# Patient Record
Sex: FEMALE | Race: WHITE
[De-identification: names, ages, dates, MRNs, and addresses within clinical notes are randomized per-mention and may not be internally consistent; named-entity substitution may affect disease eponyms.]

---

## 2018-11-16 ENCOUNTER — HOSPITAL ENCOUNTER (EMERGENCY)
Dept: HOSPITAL 33 - ED | Age: 53
Discharge: HOME | End: 2018-11-16
Payer: COMMERCIAL

## 2018-11-16 VITALS — HEART RATE: 77 BPM | DIASTOLIC BLOOD PRESSURE: 87 MMHG | SYSTOLIC BLOOD PRESSURE: 136 MMHG | OXYGEN SATURATION: 98 %

## 2018-11-16 DIAGNOSIS — K08.89: Primary | ICD-10-CM

## 2018-11-16 PROCEDURE — 99283 EMERGENCY DEPT VISIT LOW MDM: CPT

## 2018-11-16 NOTE — ERPHSYRPT
- History of Present Illness


Time Seen by Provider: 11/16/18 08:46


Source: patient


Physician History: 





mild to mod lower left toothache today, no fever, speech fluent





- Review of Systems


Constitutional: No Fever


Eyes: No Eye Redness


Ears, Nose, & Throat: No Throat Swelling


Respiratory: No Dyspnea


Cardiac: No Chest Pain


Abdominal/Gastrointestinal: No Vomiting


Skin: No Rash


Neurological: No Dizziness





- Physical Exam


General Appearance: no apparent distress


Eye Exam: eyes nml inspection


Ears, Nose, Throat Exam: moist mucous membranes, other (tender sts erythema of 

left dental margin, no fluc mass, no trismus)


Neck Exam: normal inspection


Respiratory Exam: No respiratory distress


Neurologic Exam: alert, oriented x 3, cooperative


Skin Exam: warm, dry





- Course


Nursing assessment & vital signs reviewed: Yes


Ordered Tests: 





Medication Summary











Generic Name Dose Route Start Last Admin





  Trade Name Freq  PRN Reason Stop Dose Admin


 


Clindamycin HCl  150 mg  11/16/18 08:44  





  Cleocin 150 Mg Capsule***  PO  11/16/18 08:45  





  STAT ONE   





     





     





     





     


 


Lidocaine HCl  20 ml  11/16/18 08:45  





  Xylocaine Hcl Viscous *  MM  11/16/18 08:46  





  PRN ONE   





     





     





     





     














- Progress


Progress: improved


Progress Note: 





11/16/18 08:51


see your dentist, return if worse, cleocin, viscus lidocaine


Counseled pt/family regarding: diagnosis, need for follow-up





- Departure


Time of Disposition: 08:51


Departure Disposition: Home


Clinical Impression: 


 Pain, dental





Condition: Stable


Critical Care Time: No


Instructions:  Tooth Decay, Adult (DC)


Additional Instructions: 


see your dentist, return if worse


Prescriptions: 


Clindamycin HCl [Cleocin HCl] 150 mg PO TID #30 capsule

## 2019-10-13 ENCOUNTER — HOSPITAL ENCOUNTER (EMERGENCY)
Dept: HOSPITAL 33 - ED | Age: 54
Discharge: HOME | End: 2019-10-13
Payer: COMMERCIAL

## 2019-10-13 VITALS — OXYGEN SATURATION: 95 %

## 2019-10-13 VITALS — DIASTOLIC BLOOD PRESSURE: 63 MMHG | SYSTOLIC BLOOD PRESSURE: 115 MMHG

## 2019-10-13 VITALS — HEART RATE: 58 BPM

## 2019-10-13 DIAGNOSIS — N12: Primary | ICD-10-CM

## 2019-10-13 LAB
BASOPHILS # BLD AUTO: 0.04 10*3/UL (ref 0–0.4)
BASOPHILS NFR BLD AUTO: 0.5 % (ref 0–0.4)
EOSINOPHIL # BLD AUTO: 0.25 10*3/UL (ref 0–0.5)
GLUCOSE UR-MCNC: NEGATIVE MG/DL
HCT VFR BLD AUTO: 43.8 % (ref 35–47)
HGB BLD-MCNC: 15 GM/DL (ref 12–16)
LYMPHOCYTES # SPEC AUTO: 2.63 10*3/UL (ref 1–4.6)
MCH RBC QN AUTO: 31.2 PG (ref 26–32)
MCHC RBC AUTO-ENTMCNC: 34.2 G/DL (ref 32–36)
MONOCYTES # BLD AUTO: 0.64 10*3/UL (ref 0–1.3)
PLATELET # BLD AUTO: 250 K/MM3 (ref 150–450)
PROT UR STRIP-MCNC: 100 MG/DL
RBC # BLD AUTO: 4.81 M/MM3 (ref 4.1–5.4)
RBC #/AREA URNS HPF: >101 /HPF (ref 0–2)
WBC # BLD AUTO: 8.2 K/MM3 (ref 4–10.5)
WBC #/AREA URNS HPF: >100 /HPF (ref 0–5)

## 2019-10-13 PROCEDURE — 87077 CULTURE AEROBIC IDENTIFY: CPT

## 2019-10-13 PROCEDURE — 87086 URINE CULTURE/COLONY COUNT: CPT

## 2019-10-13 PROCEDURE — 99283 EMERGENCY DEPT VISIT LOW MDM: CPT

## 2019-10-13 PROCEDURE — 36415 COLL VENOUS BLD VENIPUNCTURE: CPT

## 2019-10-13 PROCEDURE — 87186 SC STD MICRODIL/AGAR DIL: CPT

## 2019-10-13 PROCEDURE — 85025 COMPLETE CBC W/AUTO DIFF WBC: CPT

## 2019-10-13 PROCEDURE — 81001 URINALYSIS AUTO W/SCOPE: CPT

## 2019-10-13 NOTE — ERPHSYRPT
- History of Present Illness


Time Seen by Provider: 10/13/19 15:00


Source: patient


Exam Limitations: no limitations


Patient Subjective Stated Complaint: pt here for pressure to lower abd for a 

couple days, denies fever or n/v


Triage Nursing Assessment: pt alert, resp easy, skin w/d/p. abd soft, urine 

cloudy


Physician History: 


c/o pressure to lower abdomen for a couple days,


Timing/Duration: day(s) (2-3 days)


Activites at Onset: none


Quality: fullness


Onset Location: suprapubic


Pain Radiation: none


Severity of Pain-Max: mild


Severity of Pain-Current: moderate


Prior abdominal problems: none


Sexual intercourse history: non-contributory


Modifying Factors: Improves With: nothing


Associated Symptoms: denies symptoms


Allergies/Adverse Reactions: 








Penicillins Allergy (Verified 10/13/19 15:05)


 





Home Medications: 








Escitalopram Oxalate 10 mg** [Lexapro 10 MG**] 10 mg DAILY 10/13/19 [History]


Levothyroxine Sodium 25 mcg DAILY 10/13/19 [History]


Pravastatin Sodium 20 mg DAILY 10/13/19 [History]





Hx Influenza Vaccination/Date Given: No


Hx Pneumococcal Vaccination/Date Given: No





- Review of Systems


Constitutional: No Fever, No Chills


Eyes: No Symptoms


Ears, Nose, & Throat: No Symptoms


Respiratory: No Cough, No Dyspnea


Cardiac: No Chest Pain, No Edema, No Syncope


Abdominal/Gastrointestinal: No Abdominal Pain, No Nausea, No Vomiting, No 

Diarrhea


Genitourinary Symptoms: Hesitancy, No Dysuria


Musculoskeletal: No Back Pain, No Neck Pain


Skin: No Rash


Neurological: No Dizziness, No Focal Weakness, No Sensory Changes


Psychological: No Symptoms


Endocrine: No Symptoms


All Other Systems: Reviewed and Negative





- Past Medical History


Pertinent Past Medical History: Yes


Cardiac History: High Cholesterol, Hypertension


Psycho-Social History: Depression





- Past Surgical History


Past Surgical History: Yes


Genitourinary: Kidney Surgery


Musculoskeletal: Orthopedic Surgery


Female Surgical History: Hysterectomy


Other Surgical History: kidney removed right ,ankle,ear surg





- Social History


Smoking Status: Never smoker


Exposure to second hand smoke: No


Drug Use: none


Patient Lives Alone: No





- Female History


Hx Last Menstrual Period: post


Hx Pregnant Now: No





- Nursing Vital Signs


Nursing Vital Signs: 


 Initial Vital Signs











Temperature  98 F   10/13/19 14:48


 


Pulse Rate  58 L  10/13/19 14:48


 


Respiratory Rate  16   10/13/19 14:48


 


Blood Pressure  125/91   10/13/19 14:48


 


O2 Sat by Pulse Oximetry  95   10/13/19 14:48








 Pain Scale











Pain Intensity                 0

















- Physical Exam


General Appearance: no apparent distress, alert


Eye Exam: PERRL/EOMI, eyes nml inspection


Ears, Nose, Throat Exam: normal ENT inspection, TMs normal, pharynx normal, 

moist mucous membranes


Neck Exam: normal inspection, non-tender, supple, full range of motion


Respiratory Exam: normal breath sounds, lungs clear, No respiratory distress


Cardiovascular Exam: regular rate/rhythm, normal heart sounds, normal 

peripheral pulses


Gastrointestinal/Abdomen Exam: soft, No tenderness, No mass


Back Exam: normal inspection, normal range of motion, No CVA tenderness, No 

vertebral tenderness


Extremity Exam: normal inspection, normal range of motion, pelvis stable


Neurologic Exam: alert, oriented x 3, cooperative, CNs II-XII nml as tested, 

normal mood/affect, sensation nml, No motor deficits


Skin Exam: normal color, warm, dry


Lymphatic Exam: No adenopathy


SpO2: 95





- Course


Nursing assessment & vital signs reviewed: Yes


Ordered Tests: 


 Active Orders 24 hr











 Category Date Time Status


 


 CBC W DIFF Stat Lab  10/13/19 15:47 Completed


 


 CMP Stat Lab  10/13/19 15:47 Received


 


 CULTURE,URINE Stat Lab  10/13/19 16:08 Received


 


 UA W/RFX UR CULTURE Stat Lab  10/13/19 16:08 Completed








Medication Summary














Discontinued Medications














Generic Name Dose Route Start Last Admin





  Trade Name Tiki  PRN Reason Stop Dose Admin


 


Levofloxacin  750 mg  10/13/19 16:27  10/13/19 16:30





  Levofloxacin 250mg Tablet***  PO  10/13/19 16:28  750 mg





  STAT ONE   Administration





     





     





     





     


 


Levofloxacin  Confirm  10/13/19 16:27  





  Levofloxacin 250mg Tablet***  Administered  10/13/19 16:28  





  Dose   





  750 mg   





  .ROUTE   





  .STK-MED ONE   





     





     





     





     











Lab/Rad Data: 


 Laboratory Result Diagrams





 10/13/19 15:47 





 Laboratory Results











  10/13/19 10/13/19 Range/Units





  16:08 15:47 


 


WBC   8.2  (4.0-10.5)  K/mm3


 


RBC   4.81  (4.1-5.4)  M/mm3


 


Hgb   15.0  (12.0-16.0)  gm/dl


 


Hct   43.8  (35-47)  %


 


MCV   91.1  ()  fl


 


MCH   31.2  (26-32)  pg


 


MCHC   34.2  (32-36)  g/dl


 


RDW   13.1  (11.5-14.0)  %


 


Plt Count   250  (150-450)  K/mm3


 


MPV   10.7 H  (6-9.5)  fl


 


Gran %   56.2  (36.0-66.0)  %


 


Eos # (Auto)   0.25  (0-0.5)  


 


Absolute Lymphs (auto)   2.63  (1.0-4.6)  


 


Absolute Monos (auto)   0.64  (0.0-1.3)  


 


Lymphocytes %   32.3  (24.0-44.0)  %


 


Monocytes %   7.9  (0.0-12.0)  %


 


Eosinophils %   3.1  (0.00-5.0)  %


 


Basophils %   0.5  (0.0-0.4)  %


 


Absolute Granulocytes   4.59  (1.4-6.9)  


 


Basophils #   0.04  (0-0.4)  


 


Urine Color  YELLOW   (YELLOW)  


 


Urine Appearance  CLOUDY   (CLEAR)  


 


Urine pH  7.0   (5-6)  


 


Ur Specific Gravity  1.012   (1.005-1.025)  


 


Urine Protein  100   (Negative)  


 


Urine Ketones  NEGATIVE   (NEGATIVE)  


 


Urine Blood  LARGE   (0-5)  Johnathan/ul


 


Urine Nitrite  NEGATIVE   (NEGATIVE)  


 


Urine Bilirubin  NEGATIVE   (NEGATIVE)  


 


Urine Urobilinogen  NEGATIVE   (0-1)  mg/dL


 


Ur Leukocyte Esterase  LARGE   (NEGATIVE)  


 


Urine WBC (Auto)  >100   (0-5)  /HPF


 


Urine RBC (Auto)  >101   (0-2)  /HPF


 


U Hyaline Cast (Auto)  3-5   (0-2)  /LPF


 


U Epithel Cells (Auto)  RARE   (FEW)  /HPF


 


Urine Bacteria (Auto)  MODERATE   (NEGATIVE)  /HPF


 


U Non-Squamous Epi Cells  RARE   (FEW)  /HPF


 


Urine Mucus (Auto)  SLIGHT   (NEGATIVE)  /HPF


 


Urine Yeast (Budding)  Occasional   (NEGATIVE)  /HPF


 


Urine Culture Reflexed  YES   (NO)  


 


Urine Glucose  NEGATIVE   (NEGATIVE)  mg/dL














- Progress


Progress: improved


Air Movement: good


Blood Culture(s) Obtained: Yes


Antibiotics given: Yes


Counseled pt/family regarding: lab results, diagnosis, need for follow-up





- Departure


Departure Disposition: Home


Clinical Impression: 


 Pyelonephritis





Condition: Stable


Critical Care Time: No


Referrals: 


Provider,Unknown [Primary Care Provider] - 


Instructions:  Urinary Tract Infection, Adult (DC), Kidney Infection


Additional Instructions: 


URINARY TRACT INFECTION





1.  You will need to drink plenty of fluids in order to keep your urinary 

system flushed.  These fluids should mainly consist of 


     water and juices.


2.  Take medications as directed.  You need to completely finish any 

antiobiotic prescription given.


3.  Try to avoid coffee, tea, alcohol, and seasoned foods as they may cause 

bladder irritation.


4.  If signs and symptoms persist after 3-4 days, you will need to follow up 

with your family physician.


5.  Female Patients:


   A.  Avoid intercourse for 3-4 days.


   B.  Empty bladder before and after intercourse to reduce risk of re-

infection.


   C.  After emptying bladder, wipe from front to back to reduce the risk of re-

infection.





Discharge/Care Plan





OVIDIO JANG was seen on 10/13/19 in the Emergency Room. The patient was 

counseled regarding Diagnosis,Lab results, Imaging studies, need for follow up 

and when to return to the Emergency Room.





Prescriptions given:





Discharge Note





I have spoken with the patient and/or caregivers. I have explained the patient'

s condition, diagnosis and treatment plan based on the information available to 

me at this time. I have answered the patient's and/or caregiver's questions and 

addressed any concerns. The patient and/or caregivers have as good 

understanding of the patient's diagnosis, condition and treatment plan as can 

be expected at this point. The vital signs have been stable. The patient's 

condition is stable and appropriate for discharge from the emergency department.





The patient will pursue further outpatient evaluation with the primary care 

physician or other designated or consulting physician as outlined in the 

discharge instructions. The patient and/or caregivers are agreeable to this 

plan of care and follow-up instructions have been explained in detail. The 

patient and/or caregivers have received these instruction. The patient/and or 

caregivers are aware that any significant change in condition or worsening of 

symptoms should prompt an immediate return to this or the closest emergency 

department or call 911. 





OVIDIO JANG was seen on 10/13/19 n the Emergency Room. At that time you were 

treated for an emergent condition, during your visit Laboratory, Radiology and/

or other procedures may have been ordered. It is very important that you follow-

up with your Primary Care Physician Unknown Provider within the next 24-48 

hours to review your Emergency Room visit and the final results of testing that 

was ordered.  Some test results such as Urine Cultures, Blood Cultures, and 

other cultures if ordered will not be finalized for 24-48 hours.





If you do not have a Primary Care Provider please call the medical records 

department at [phone] to obtain a copy of your results or you may 

sign into our patient portal to obtain these results by visiting us @ [email or web address]

[email or web address] and completing the following steps:





1. Click on the Patient Portal link





2. Click the Patient Self Enrollment Link to complete the enrollment form and 

entering your Medical Record Number [id number]





3. Once the enrollment form is completed you will receive an email with a 

temporary ID and password at the email address you provided. 





4. Next choose a user name and password. Your user name must be at least 4 

characters long and your password must be at least 4 characters long.





5. Choose a security question from the list and provide your answer to the 

question.





If you already have signed into the Health Portal you may access your Health 

Care Information  24/7 by the following steps:





1. Login to  our website @ [email or web address]





2. Enter your original user name and password.





FAQS





The [facility] Health Portal is an online tool that contains your Lab Results, 

Radiology Reports, Visit History, Discharge Instructions and Health Summary 





Lab and Radiology Results will not be available for 72 hours on the portal.





The Portal is a secure site, passwords are encryted and URLs are re-written so 

they cannot be copied and pasted. You and authorized family members are the 

only ones who can access your Portal. Also there is a timeout feature that 

protects your information if you leave the Portal page open.





If you have technical difficulty please use the Contact Us link on the page 

this will allow you to submit any questions you have regarding the Portal or 

you may contact the Medical Record Department at [phone].


Prescriptions: 


Levofloxacin*** [Levaquin 500 MG Tablet***] 500 mg PO QAM #7 tablet